# Patient Record
Sex: FEMALE | Race: BLACK OR AFRICAN AMERICAN | NOT HISPANIC OR LATINO
[De-identification: names, ages, dates, MRNs, and addresses within clinical notes are randomized per-mention and may not be internally consistent; named-entity substitution may affect disease eponyms.]

---

## 2017-04-13 PROBLEM — Z00.00 ENCOUNTER FOR PREVENTIVE HEALTH EXAMINATION: Status: ACTIVE | Noted: 2017-04-13

## 2017-04-17 ENCOUNTER — LABORATORY RESULT (OUTPATIENT)
Age: 37
End: 2017-04-17

## 2017-04-17 ENCOUNTER — APPOINTMENT (OUTPATIENT)
Dept: OBGYN | Facility: CLINIC | Age: 37
End: 2017-04-17

## 2017-04-17 VITALS
SYSTOLIC BLOOD PRESSURE: 120 MMHG | WEIGHT: 196 LBS | HEIGHT: 66 IN | BODY MASS INDEX: 31.5 KG/M2 | DIASTOLIC BLOOD PRESSURE: 80 MMHG

## 2017-04-17 DIAGNOSIS — Z80.3 FAMILY HISTORY OF MALIGNANT NEOPLASM OF BREAST: ICD-10-CM

## 2017-04-17 DIAGNOSIS — D25.9 LEIOMYOMA OF UTERUS, UNSPECIFIED: ICD-10-CM

## 2017-04-17 DIAGNOSIS — Z78.9 OTHER SPECIFIED HEALTH STATUS: ICD-10-CM

## 2017-04-17 LAB
BILIRUB UR QL STRIP: NORMAL
CLARITY UR: CLEAR
COLLECTION METHOD: NORMAL
GLUCOSE UR-MCNC: NORMAL
HCG UR QL: 1 EU/DL
HCG UR QL: POSITIVE
HGB UR QL STRIP.AUTO: NORMAL
KETONES UR-MCNC: NORMAL
LEUKOCYTE ESTERASE UR QL STRIP: NORMAL
NITRITE UR QL STRIP: NORMAL
PH UR STRIP: 6
PROT UR STRIP-MCNC: NORMAL
QUALITY CONTROL: YES
SP GR UR STRIP: 1.02

## 2017-04-25 LAB
ABO + RH PNL BLD: NORMAL
APPEARANCE: CLEAR
AR GENE MUT ANL BLD/T: NORMAL
B19V DNA FLD QL NAA+PROBE: NORMAL
BACTERIA UR CULT: ABNORMAL
BASOPHILS # BLD AUTO: 0.03 K/UL
BASOPHILS NFR BLD AUTO: 0.3 %
BILIRUBIN URINE: NEGATIVE
BLD GP AB SCN SERPL QL: NORMAL
BLOOD URINE: NEGATIVE
C TRACH RRNA SPEC QL NAA+PROBE: NORMAL
CMV IGG SERPL QL: >10 U/ML
CMV IGG SERPL-IMP: POSITIVE
CMV IGM SERPL QL: 10.6 AU/ML
CMV IGM SERPL QL: NEGATIVE
COLOR: ABNORMAL
CYTOLOGY CVX/VAG DOC THIN PREP: NORMAL
EOSINOPHIL # BLD AUTO: 0.01 K/UL
EOSINOPHIL NFR BLD AUTO: 0.1 %
FMR1 GENE MUT ANL BLD/T: NORMAL
GLUCOSE QUALITATIVE U: NORMAL MG/DL
HBV SURFACE AG SER QL: NONREACTIVE
HCT VFR BLD CALC: 37.7 %
HCV AB SER QL: NONREACTIVE
HCV S/CO RATIO: 0.12 S/CO
HGB A MFR BLD: 97.5 %
HGB A2 MFR BLD: 2.5 %
HGB BLD-MCNC: 12.4 G/DL
HGB FRACT BLD-IMP: NORMAL
HIV1+2 AB SPEC QL IA.RAPID: NONREACTIVE
HPV HIGH+LOW RISK DNA PNL CVX: NEGATIVE
IMM GRANULOCYTES NFR BLD AUTO: 0.2 %
KETONES URINE: NEGATIVE
LEUKOCYTE ESTERASE URINE: NEGATIVE
LYMPHOCYTES # BLD AUTO: 2.05 K/UL
LYMPHOCYTES NFR BLD AUTO: 23.2 %
MAN DIFF?: NORMAL
MCHC RBC-ENTMCNC: 27.7 PG
MCHC RBC-ENTMCNC: 32.9 GM/DL
MCV RBC AUTO: 84.2 FL
MEV IGG FLD QL IA: 261 AU/ML
MEV IGG+IGM SER-IMP: POSITIVE
MONOCYTES # BLD AUTO: 0.41 K/UL
MONOCYTES NFR BLD AUTO: 4.6 %
N GONORRHOEA RRNA SPEC QL NAA+PROBE: NORMAL
NEUTROPHILS # BLD AUTO: 6.32 K/UL
NEUTROPHILS NFR BLD AUTO: 71.6 %
NITRITE URINE: NEGATIVE
PH URINE: 6
PLATELET # BLD AUTO: 290 K/UL
PROTEIN URINE: NEGATIVE MG/DL
RBC # BLD: 4.48 M/UL
RBC # FLD: 12.3 %
RPR SER-TITR: NORMAL
RUBV IGG FLD-ACNC: 17.1 INDEX
RUBV IGG SER-IMP: POSITIVE
RUBV IGM FLD-ACNC: <20 AU/ML
SOURCE TP AMPLIFICATION: NORMAL
SPECIFIC GRAVITY URINE: 1.03
T GONDII AB SER-IMP: NEGATIVE
T GONDII AB SER-IMP: NEGATIVE
T GONDII IGG SER QL: <3 IU/ML
T GONDII IGM SER QL: <3 AU/ML
TSH SERPL-ACNC: 2.27 UIU/ML
UROBILINOGEN URINE: 1 MG/DL
VZV AB TITR SER: POSITIVE
VZV IGG SER IF-ACNC: >4000 INDEX
WBC # FLD AUTO: 8.84 K/UL

## 2017-04-26 LAB — CFTR MUT TESTED BLD/T: NORMAL

## 2017-04-27 ENCOUNTER — MESSAGE (OUTPATIENT)
Age: 37
End: 2017-04-27

## 2017-05-08 ENCOUNTER — APPOINTMENT (OUTPATIENT)
Dept: OBGYN | Facility: CLINIC | Age: 37
End: 2017-05-08

## 2017-05-17 ENCOUNTER — APPOINTMENT (OUTPATIENT)
Dept: OBGYN | Facility: CLINIC | Age: 37
End: 2017-05-17

## 2017-05-17 VITALS
SYSTOLIC BLOOD PRESSURE: 124 MMHG | HEIGHT: 66 IN | WEIGHT: 194 LBS | DIASTOLIC BLOOD PRESSURE: 80 MMHG | BODY MASS INDEX: 31.18 KG/M2

## 2017-05-24 ENCOUNTER — MESSAGE (OUTPATIENT)
Age: 37
End: 2017-05-24

## 2017-05-25 ENCOUNTER — RESULT REVIEW (OUTPATIENT)
Age: 37
End: 2017-05-25

## 2017-05-25 LAB — BACTERIA UR CULT: ABNORMAL

## 2017-05-31 ENCOUNTER — ASOB RESULT (OUTPATIENT)
Age: 37
End: 2017-05-31

## 2017-05-31 ENCOUNTER — APPOINTMENT (OUTPATIENT)
Dept: ANTEPARTUM | Facility: CLINIC | Age: 37
End: 2017-05-31

## 2017-06-05 ENCOUNTER — RX RENEWAL (OUTPATIENT)
Age: 37
End: 2017-06-05

## 2017-06-06 ENCOUNTER — APPOINTMENT (OUTPATIENT)
Dept: MATERNAL FETAL MEDICINE | Facility: CLINIC | Age: 37
End: 2017-06-06

## 2017-06-12 ENCOUNTER — APPOINTMENT (OUTPATIENT)
Dept: OBGYN | Facility: CLINIC | Age: 37
End: 2017-06-12

## 2017-06-12 ENCOUNTER — OTHER (OUTPATIENT)
Age: 37
End: 2017-06-12

## 2017-06-12 ENCOUNTER — RESULT CHARGE (OUTPATIENT)
Age: 37
End: 2017-06-12

## 2017-06-12 VITALS — SYSTOLIC BLOOD PRESSURE: 120 MMHG | BODY MASS INDEX: 30.7 KG/M2 | WEIGHT: 190.2 LBS | DIASTOLIC BLOOD PRESSURE: 70 MMHG

## 2017-06-12 LAB
BILIRUB UR QL STRIP: NORMAL
GLUCOSE UR-MCNC: NORMAL
HCG UR QL: 0.2 EU/DL
HGB UR QL STRIP.AUTO: NORMAL
KETONES UR-MCNC: NORMAL
LEUKOCYTE ESTERASE UR QL STRIP: NORMAL
NITRITE UR QL STRIP: NORMAL
PH UR STRIP: 7.5
PROT UR STRIP-MCNC: NORMAL
SP GR UR STRIP: 1.02

## 2017-06-12 RX ORDER — DOXYLAMINE SUCCINATE AND PYRIDOXINE HYDROCHLORIDE 10; 10 MG/1; MG/1
10-10 TABLET, DELAYED RELEASE ORAL
Qty: 60 | Refills: 6 | Status: DISCONTINUED | COMMUNITY
Start: 2017-05-17 | End: 2017-06-12

## 2017-06-28 ENCOUNTER — APPOINTMENT (OUTPATIENT)
Dept: OBGYN | Facility: CLINIC | Age: 37
End: 2017-06-28

## 2017-07-18 ENCOUNTER — RX RENEWAL (OUTPATIENT)
Age: 37
End: 2017-07-18

## 2017-07-19 ENCOUNTER — APPOINTMENT (OUTPATIENT)
Dept: OBGYN | Facility: CLINIC | Age: 37
End: 2017-07-19

## 2017-07-19 VITALS — BODY MASS INDEX: 31.55 KG/M2 | SYSTOLIC BLOOD PRESSURE: 120 MMHG | WEIGHT: 195.5 LBS | DIASTOLIC BLOOD PRESSURE: 76 MMHG

## 2017-07-20 LAB
BILIRUB UR QL STRIP: NORMAL
CLARITY UR: CLEAR
COLLECTION METHOD: NORMAL
GLUCOSE UR-MCNC: NORMAL
HCG UR QL: 0.2 EU/DL
HGB UR QL STRIP.AUTO: NORMAL
KETONES UR-MCNC: NORMAL
LEUKOCYTE ESTERASE UR QL STRIP: NORMAL
NITRITE UR QL STRIP: NORMAL
PH UR STRIP: 6
PROT UR STRIP-MCNC: NORMAL
SP GR UR STRIP: 1.01

## 2017-07-24 ENCOUNTER — RESULT REVIEW (OUTPATIENT)
Age: 37
End: 2017-07-24

## 2017-07-24 LAB
1ST TRIMESTER DATA: NORMAL
2ND TRIMESTER DATA: NORMAL
AFP PNL SERPL: NORMAL
AFP SERPL-ACNC: NORMAL
AFP SERPL-ACNC: NORMAL
B-HCG FREE SERPL-MCNC: NORMAL
CLINICAL BIOCHEMIST REVIEW: NORMAL
FREE BETA HCG 1ST TRIMESTER: NORMAL
INHIBIN A SERPL-MCNC: NORMAL
NOTES NTD: NORMAL
NT: NORMAL
PAPP-A SERPL-ACNC: NORMAL
U ESTRIOL SERPL-SCNC: NORMAL

## 2017-07-27 ENCOUNTER — APPOINTMENT (OUTPATIENT)
Dept: ANTEPARTUM | Facility: CLINIC | Age: 37
End: 2017-07-27

## 2017-08-09 ENCOUNTER — ASOB RESULT (OUTPATIENT)
Age: 37
End: 2017-08-09

## 2017-08-09 ENCOUNTER — APPOINTMENT (OUTPATIENT)
Dept: ANTEPARTUM | Facility: CLINIC | Age: 37
End: 2017-08-09
Payer: OTHER GOVERNMENT

## 2017-08-09 PROCEDURE — 76817 TRANSVAGINAL US OBSTETRIC: CPT

## 2017-08-09 PROCEDURE — 76811 OB US DETAILED SNGL FETUS: CPT

## 2017-08-30 ENCOUNTER — APPOINTMENT (OUTPATIENT)
Dept: OBGYN | Facility: CLINIC | Age: 37
End: 2017-08-30
Payer: OTHER GOVERNMENT

## 2017-08-30 VITALS
DIASTOLIC BLOOD PRESSURE: 72 MMHG | BODY MASS INDEX: 32.3 KG/M2 | HEIGHT: 66 IN | TEMPERATURE: 98.6 F | RESPIRATION RATE: 16 BRPM | SYSTOLIC BLOOD PRESSURE: 128 MMHG | WEIGHT: 201 LBS

## 2017-08-30 LAB
BILIRUB UR QL STRIP: NORMAL
CLARITY UR: CLEAR
COLLECTION METHOD: NORMAL
GLUCOSE UR-MCNC: 100
HCG UR QL: 0.2 EU/DL
HGB UR QL STRIP.AUTO: NORMAL
KETONES UR-MCNC: NORMAL
LEUKOCYTE ESTERASE UR QL STRIP: NORMAL
NITRITE UR QL STRIP: NORMAL
PH UR STRIP: 6
PROT UR STRIP-MCNC: NORMAL
SP GR UR STRIP: 1.03

## 2017-08-30 PROCEDURE — 0502F SUBSEQUENT PRENATAL CARE: CPT

## 2017-08-31 RX ORDER — ASPIRIN 81 MG/1
81 TABLET, COATED ORAL DAILY
Qty: 30 | Refills: 3 | Status: COMPLETED | COMMUNITY
Start: 2017-06-12 | End: 2017-06-12

## 2017-08-31 RX ORDER — ASCORBIC ACID, CHOLECALCIFEROL, .ALPHA.-TOCOPHEROL, DL-, FOLIC ACID, PYRIDOXINE HYDROCHLORIDE, CYANOCOBALAMIN, CALCIUM FORMATE, FERROUS ASPARTO GLYCINATE, MAGNESIUM OXIDE AND DOCONEXENT 90; 400; 40; 1; 26; 25; 155; 18; 50; 300 MG/1; [IU]/1; [IU]/1; MG/1; MG/1; UG/1; MG/1; MG/1; MG/1; MG/1
18-0.6-0.4-3 CAPSULE, GELATIN COATED ORAL
Qty: 28 | Refills: 6 | Status: COMPLETED | COMMUNITY
Start: 2017-05-17 | End: 2017-05-17

## 2017-08-31 RX ORDER — METOCLOPRAMIDE 10 MG/1
10 TABLET ORAL 4 TIMES DAILY
Qty: 120 | Refills: 0 | Status: COMPLETED | COMMUNITY
Start: 2017-06-12 | End: 2017-06-12

## 2017-09-18 DIAGNOSIS — Z3A.10 10 WEEKS GESTATION OF PREGNANCY: ICD-10-CM

## 2017-09-18 DIAGNOSIS — Z3A.19 19 WEEKS GESTATION OF PREGNANCY: ICD-10-CM

## 2017-09-18 DIAGNOSIS — Z3A.14 14 WEEKS GESTATION OF PREGNANCY: ICD-10-CM

## 2017-09-19 ENCOUNTER — APPOINTMENT (OUTPATIENT)
Dept: ANTEPARTUM | Facility: CLINIC | Age: 37
End: 2017-09-19
Payer: OTHER GOVERNMENT

## 2017-09-19 ENCOUNTER — ASOB RESULT (OUTPATIENT)
Age: 37
End: 2017-09-19

## 2017-09-19 PROCEDURE — 76805 OB US >/= 14 WKS SNGL FETUS: CPT

## 2017-09-27 ENCOUNTER — OUTPATIENT (OUTPATIENT)
Dept: INPATIENT UNIT | Facility: HOSPITAL | Age: 37
LOS: 1 days | Discharge: HOME | End: 2017-09-27

## 2017-09-27 ENCOUNTER — APPOINTMENT (OUTPATIENT)
Dept: OBGYN | Facility: CLINIC | Age: 37
End: 2017-09-27
Payer: OTHER GOVERNMENT

## 2017-09-27 VITALS
HEART RATE: 92 BPM | SYSTOLIC BLOOD PRESSURE: 142 MMHG | BODY MASS INDEX: 33.11 KG/M2 | DIASTOLIC BLOOD PRESSURE: 82 MMHG | HEIGHT: 66 IN | TEMPERATURE: 98.7 F | WEIGHT: 206 LBS

## 2017-09-27 DIAGNOSIS — I10 ESSENTIAL (PRIMARY) HYPERTENSION: ICD-10-CM

## 2017-09-27 LAB
ALBUMIN SERPL ELPH-MCNC: 3 G/DL — LOW (ref 3.3–5)
ALP SERPL-CCNC: 106 U/L — SIGNIFICANT CHANGE UP (ref 40–120)
ALT FLD-CCNC: 15 U/L — SIGNIFICANT CHANGE UP (ref 12–78)
ANION GAP SERPL CALC-SCNC: 9 MMOL/L — SIGNIFICANT CHANGE UP (ref 5–17)
APPEARANCE UR: CLEAR — SIGNIFICANT CHANGE UP
APTT BLD: 26.4 SEC — LOW (ref 27.5–37.4)
AST SERPL-CCNC: 15 U/L — SIGNIFICANT CHANGE UP (ref 15–37)
BASOPHILS # BLD AUTO: 0.1 K/UL — SIGNIFICANT CHANGE UP (ref 0–0.2)
BASOPHILS NFR BLD AUTO: 0.5 % — SIGNIFICANT CHANGE UP (ref 0–2)
BILIRUB SERPL-MCNC: 0.2 MG/DL — SIGNIFICANT CHANGE UP (ref 0.2–1.2)
BILIRUB UR QL STRIP: NORMAL
BILIRUB UR-MCNC: NEGATIVE — SIGNIFICANT CHANGE UP
BUN SERPL-MCNC: 5 MG/DL — LOW (ref 7–23)
CALCIUM SERPL-MCNC: 8.8 MG/DL — SIGNIFICANT CHANGE UP (ref 8.5–10.1)
CHLORIDE SERPL-SCNC: 107 MMOL/L — SIGNIFICANT CHANGE UP (ref 96–108)
CLARITY UR: CLEAR
CO2 SERPL-SCNC: 23 MMOL/L — SIGNIFICANT CHANGE UP (ref 22–31)
COLLECTION METHOD: NORMAL
COLOR SPEC: YELLOW — SIGNIFICANT CHANGE UP
CREAT ?TM UR-MCNC: 38 MG/DL — SIGNIFICANT CHANGE UP
CREAT SERPL-MCNC: 0.68 MG/DL — SIGNIFICANT CHANGE UP (ref 0.5–1.3)
DIFF PNL FLD: NEGATIVE — SIGNIFICANT CHANGE UP
EOSINOPHIL # BLD AUTO: 0.1 K/UL — SIGNIFICANT CHANGE UP (ref 0–0.5)
EOSINOPHIL NFR BLD AUTO: 0.6 % — SIGNIFICANT CHANGE UP (ref 0–6)
FIBRINOGEN PPP-MCNC: 644 MG/DL — HIGH (ref 310–510)
GLUCOSE SERPL-MCNC: 90 MG/DL — SIGNIFICANT CHANGE UP (ref 70–99)
GLUCOSE UR QL: NEGATIVE MG/DL — SIGNIFICANT CHANGE UP
GLUCOSE UR-MCNC: NORMAL
HCG UR QL: NORMAL EU/DL
HCT VFR BLD CALC: 35.1 % — SIGNIFICANT CHANGE UP (ref 34.5–45)
HGB BLD-MCNC: 12.2 G/DL — SIGNIFICANT CHANGE UP (ref 11.5–15.5)
HGB UR QL STRIP.AUTO: NORMAL
INR BLD: 0.95 RATIO — SIGNIFICANT CHANGE UP (ref 0.88–1.16)
KETONES UR-MCNC: NEGATIVE — SIGNIFICANT CHANGE UP
KETONES UR-MCNC: NORMAL
LDH SERPL L TO P-CCNC: 204 U/L — SIGNIFICANT CHANGE UP (ref 50–242)
LEUKOCYTE ESTERASE UR QL STRIP: NORMAL
LEUKOCYTE ESTERASE UR-ACNC: NEGATIVE — SIGNIFICANT CHANGE UP
LYMPHOCYTES # BLD AUTO: 1.9 K/UL — SIGNIFICANT CHANGE UP (ref 1–3.3)
LYMPHOCYTES # BLD AUTO: 11.5 % — LOW (ref 13–44)
MCHC RBC-ENTMCNC: 29.8 PG — SIGNIFICANT CHANGE UP (ref 27–34)
MCHC RBC-ENTMCNC: 34.7 GM/DL — SIGNIFICANT CHANGE UP (ref 32–36)
MCV RBC AUTO: 85.9 FL — SIGNIFICANT CHANGE UP (ref 80–100)
MONOCYTES # BLD AUTO: 0.9 K/UL — SIGNIFICANT CHANGE UP (ref 0–0.9)
MONOCYTES NFR BLD AUTO: 5.4 % — SIGNIFICANT CHANGE UP (ref 2–14)
NEUTROPHILS # BLD AUTO: 13.4 K/UL — HIGH (ref 1.8–7.4)
NEUTROPHILS NFR BLD AUTO: 82 % — HIGH (ref 43–77)
NITRITE UR QL STRIP: NORMAL
NITRITE UR-MCNC: NEGATIVE — SIGNIFICANT CHANGE UP
PH UR STRIP: 7
PH UR: 6.5 — SIGNIFICANT CHANGE UP (ref 5–8)
PLATELET # BLD AUTO: 200 K/UL — SIGNIFICANT CHANGE UP (ref 150–400)
POTASSIUM SERPL-MCNC: 3.5 MMOL/L — SIGNIFICANT CHANGE UP (ref 3.5–5.3)
POTASSIUM SERPL-SCNC: 3.5 MMOL/L — SIGNIFICANT CHANGE UP (ref 3.5–5.3)
PROT ?TM UR-MCNC: 7 MG/DL — SIGNIFICANT CHANGE UP (ref 0–12)
PROT SERPL-MCNC: 6.9 GM/DL — SIGNIFICANT CHANGE UP (ref 6–8.3)
PROT UR STRIP-MCNC: 7
PROT UR-MCNC: NEGATIVE MG/DL — SIGNIFICANT CHANGE UP
PROT/CREAT UR-RTO: 0.2 RATIO — SIGNIFICANT CHANGE UP (ref 0–0.2)
PROTHROM AB SERPL-ACNC: 10.3 SEC — SIGNIFICANT CHANGE UP (ref 9.8–12.7)
RBC # BLD: 4.08 M/UL — SIGNIFICANT CHANGE UP (ref 3.8–5.2)
RBC # FLD: 11.7 % — SIGNIFICANT CHANGE UP (ref 10.3–14.5)
SODIUM SERPL-SCNC: 139 MMOL/L — SIGNIFICANT CHANGE UP (ref 135–145)
SP GR SPEC: 1.01 — SIGNIFICANT CHANGE UP (ref 1.01–1.02)
SP GR UR STRIP: 1
URATE SERPL-MCNC: 4 MG/DL — SIGNIFICANT CHANGE UP (ref 2.5–7)
UROBILINOGEN FLD QL: NEGATIVE MG/DL — SIGNIFICANT CHANGE UP
WBC # BLD: 16.4 K/UL — HIGH (ref 3.8–10.5)
WBC # FLD AUTO: 16.4 K/UL — HIGH (ref 3.8–10.5)

## 2017-09-27 PROCEDURE — 90656 IIV3 VACC NO PRSV 0.5 ML IM: CPT

## 2017-09-27 PROCEDURE — 90471 IMMUNIZATION ADMIN: CPT

## 2017-09-27 PROCEDURE — 82951 GLUCOSE TOLERANCE TEST (GTT): CPT | Mod: QW

## 2017-09-27 PROCEDURE — 36415 COLL VENOUS BLD VENIPUNCTURE: CPT

## 2017-09-27 PROCEDURE — 0502F SUBSEQUENT PRENATAL CARE: CPT

## 2017-09-28 LAB
BASOPHILS # BLD AUTO: 0.02 K/UL
BASOPHILS NFR BLD AUTO: 0.2 %
EOSINOPHIL # BLD AUTO: 0.07 K/UL
EOSINOPHIL NFR BLD AUTO: 0.6 %
GLUCOSE 1H P 50 G GLC PO SERPL-MCNC: 117 MG/DL
HCT VFR BLD CALC: 35.3 %
HGB BLD-MCNC: 11.4 G/DL
IMM GRANULOCYTES NFR BLD AUTO: 1.2 %
LYMPHOCYTES # BLD AUTO: 1.82 K/UL
LYMPHOCYTES NFR BLD AUTO: 14.9 %
MAN DIFF?: NORMAL
MCHC RBC-ENTMCNC: 28 PG
MCHC RBC-ENTMCNC: 32.3 GM/DL
MCV RBC AUTO: 86.7 FL
MONOCYTES # BLD AUTO: 0.55 K/UL
MONOCYTES NFR BLD AUTO: 4.5 %
NEUTROPHILS # BLD AUTO: 9.57 K/UL
NEUTROPHILS NFR BLD AUTO: 78.6 %
PLATELET # BLD AUTO: 239 K/UL
RBC # BLD: 4.07 M/UL
RBC # FLD: 13.1 %
WBC # FLD AUTO: 12.18 K/UL

## 2017-10-03 DIAGNOSIS — O47.9 FALSE LABOR, UNSPECIFIED: ICD-10-CM

## 2017-10-04 ENCOUNTER — APPOINTMENT (OUTPATIENT)
Dept: OBGYN | Facility: CLINIC | Age: 37
End: 2017-10-04
Payer: OTHER GOVERNMENT

## 2017-10-04 VITALS
BODY MASS INDEX: 33.11 KG/M2 | DIASTOLIC BLOOD PRESSURE: 72 MMHG | WEIGHT: 206 LBS | HEIGHT: 66 IN | SYSTOLIC BLOOD PRESSURE: 120 MMHG

## 2017-10-04 LAB
BILIRUB UR QL STRIP: NORMAL
CLARITY UR: CLEAR
COLLECTION METHOD: NORMAL
GLUCOSE UR-MCNC: NORMAL
HCG UR QL: 0.2 EU/DL
HGB UR QL STRIP.AUTO: NORMAL
KETONES UR-MCNC: NORMAL
LEUKOCYTE ESTERASE UR QL STRIP: NORMAL
NITRITE UR QL STRIP: NORMAL
PH UR STRIP: 7.5
PROT UR STRIP-MCNC: NORMAL
SP GR UR STRIP: 1.02

## 2017-10-04 PROCEDURE — 0502F SUBSEQUENT PRENATAL CARE: CPT

## 2017-10-05 ENCOUNTER — APPOINTMENT (OUTPATIENT)
Dept: PEDIATRIC CARDIOLOGY | Facility: CLINIC | Age: 37
End: 2017-10-05
Payer: OTHER GOVERNMENT

## 2017-10-05 PROCEDURE — 93325 DOPPLER ECHO COLOR FLOW MAPG: CPT | Mod: 59

## 2017-10-05 PROCEDURE — 76820 UMBILICAL ARTERY ECHO: CPT

## 2017-10-05 PROCEDURE — 76825 ECHO EXAM OF FETAL HEART: CPT

## 2017-10-05 PROCEDURE — 76827 ECHO EXAM OF FETAL HEART: CPT

## 2017-10-05 PROCEDURE — 76821 MIDDLE CEREBRAL ARTERY ECHO: CPT

## 2017-10-05 PROCEDURE — 99242 OFF/OP CONSLTJ NEW/EST SF 20: CPT | Mod: 25

## 2017-10-18 ENCOUNTER — APPOINTMENT (OUTPATIENT)
Dept: OBGYN | Facility: CLINIC | Age: 37
End: 2017-10-18
Payer: OTHER GOVERNMENT

## 2017-10-18 PROCEDURE — 90471 IMMUNIZATION ADMIN: CPT

## 2017-10-18 PROCEDURE — 90715 TDAP VACCINE 7 YRS/> IM: CPT

## 2017-10-18 PROCEDURE — 0502F SUBSEQUENT PRENATAL CARE: CPT

## 2017-10-26 ENCOUNTER — APPOINTMENT (OUTPATIENT)
Dept: ANTEPARTUM | Facility: CLINIC | Age: 37
End: 2017-10-26
Payer: OTHER GOVERNMENT

## 2017-10-31 ENCOUNTER — APPOINTMENT (OUTPATIENT)
Dept: ANTEPARTUM | Facility: CLINIC | Age: 37
End: 2017-10-31
Payer: OTHER GOVERNMENT

## 2017-10-31 ENCOUNTER — ASOB RESULT (OUTPATIENT)
Age: 37
End: 2017-10-31

## 2017-10-31 DIAGNOSIS — Z34.90 ENCOUNTER FOR SUPERVISION OF NORMAL PREGNANCY, UNSPECIFIED, UNSPECIFIED TRIMESTER: ICD-10-CM

## 2017-10-31 DIAGNOSIS — Z3A.30 30 WEEKS GESTATION OF PREGNANCY: ICD-10-CM

## 2017-10-31 DIAGNOSIS — Z3A.25 25 WEEKS GESTATION OF PREGNANCY: ICD-10-CM

## 2017-10-31 DIAGNOSIS — O21.9 VOMITING OF PREGNANCY, UNSPECIFIED: ICD-10-CM

## 2017-10-31 DIAGNOSIS — Z34.02 ENCOUNTER FOR SUPERVISION OF NORMAL FIRST PREGNANCY, SECOND TRIMESTER: ICD-10-CM

## 2017-10-31 DIAGNOSIS — Z3A.29 29 WEEKS GESTATION OF PREGNANCY: ICD-10-CM

## 2017-10-31 PROCEDURE — 76819 FETAL BIOPHYS PROFIL W/O NST: CPT

## 2017-10-31 PROCEDURE — 76816 OB US FOLLOW-UP PER FETUS: CPT

## 2017-11-01 ENCOUNTER — APPOINTMENT (OUTPATIENT)
Dept: OBGYN | Facility: CLINIC | Age: 37
End: 2017-11-01

## 2017-11-08 ENCOUNTER — APPOINTMENT (OUTPATIENT)
Dept: OBGYN | Facility: CLINIC | Age: 37
End: 2017-11-08
Payer: OTHER GOVERNMENT

## 2017-11-08 ENCOUNTER — LABORATORY RESULT (OUTPATIENT)
Age: 37
End: 2017-11-08

## 2017-11-08 DIAGNOSIS — B00.9 HERPESVIRAL INFECTION, UNSPECIFIED: ICD-10-CM

## 2017-11-08 PROCEDURE — 36415 COLL VENOUS BLD VENIPUNCTURE: CPT

## 2017-11-08 PROCEDURE — 0502F SUBSEQUENT PRENATAL CARE: CPT

## 2017-11-09 LAB
BASOPHILS # BLD AUTO: 0.02 K/UL
BASOPHILS NFR BLD AUTO: 0.2 %
EOSINOPHIL # BLD AUTO: 0.06 K/UL
EOSINOPHIL NFR BLD AUTO: 0.6 %
HCT VFR BLD CALC: 37.6 %
HGB BLD-MCNC: 12.1 G/DL
HIV1+2 AB SPEC QL IA.RAPID: NONREACTIVE
IMM GRANULOCYTES NFR BLD AUTO: 1.2 %
LYMPHOCYTES # BLD AUTO: 1.8 K/UL
LYMPHOCYTES NFR BLD AUTO: 17.1 %
MAN DIFF?: NORMAL
MCHC RBC-ENTMCNC: 28.3 PG
MCHC RBC-ENTMCNC: 32.2 GM/DL
MCV RBC AUTO: 87.9 FL
MONOCYTES # BLD AUTO: 0.56 K/UL
MONOCYTES NFR BLD AUTO: 5.3 %
NEUTROPHILS # BLD AUTO: 7.93 K/UL
NEUTROPHILS NFR BLD AUTO: 75.6 %
PLATELET # BLD AUTO: 212 K/UL
RBC # BLD: 4.28 M/UL
RBC # FLD: 13 %
WBC # FLD AUTO: 10.5 K/UL

## 2017-11-13 LAB
GP B STREP DNA SPEC QL NAA+PROBE: DETECTED
GP B STREP DNA SPEC QL NAA+PROBE: DETECTED
GP B STREP DNA SPEC QL NAA+PROBE: NORMAL
GP B STREP DNA SPEC QL NAA+PROBE: NORMAL
SOURCE GBS: NORMAL
SOURCE GBS: NORMAL

## 2017-11-20 ENCOUNTER — APPOINTMENT (OUTPATIENT)
Dept: OBGYN | Facility: CLINIC | Age: 37
End: 2017-11-20
Payer: OTHER GOVERNMENT

## 2017-11-20 ENCOUNTER — RESULT REVIEW (OUTPATIENT)
Age: 37
End: 2017-11-20

## 2017-11-20 VITALS
HEIGHT: 66 IN | DIASTOLIC BLOOD PRESSURE: 64 MMHG | SYSTOLIC BLOOD PRESSURE: 138 MMHG | WEIGHT: 217 LBS | RESPIRATION RATE: 16 BRPM | TEMPERATURE: 98.6 F | BODY MASS INDEX: 34.87 KG/M2

## 2017-11-20 DIAGNOSIS — O09.513 SUPERVISION OF ELDERLY PRIMIGRAVIDA, THIRD TRIMESTER: ICD-10-CM

## 2017-11-20 DIAGNOSIS — Z22.330 CARRIER OF GROUP B STREPTOCOCCUS: ICD-10-CM

## 2017-11-20 DIAGNOSIS — Z3A.32 32 WEEKS GESTATION OF PREGNANCY: ICD-10-CM

## 2017-11-20 LAB
BILIRUB UR QL STRIP: NORMAL
CLARITY UR: CLEAR
COLLECTION METHOD: NORMAL
GLUCOSE UR-MCNC: NORMAL
HCG UR QL: 0.2 EU/DL
HGB UR QL STRIP.AUTO: NORMAL
KETONES UR-MCNC: NORMAL
LEUKOCYTE ESTERASE UR QL STRIP: NORMAL
NITRITE UR QL STRIP: NORMAL
PH UR STRIP: 6
PROT UR STRIP-MCNC: NORMAL
SP GR UR STRIP: 1.03

## 2017-11-20 PROCEDURE — 0502F SUBSEQUENT PRENATAL CARE: CPT

## 2017-11-21 ENCOUNTER — APPOINTMENT (OUTPATIENT)
Dept: ANTEPARTUM | Facility: CLINIC | Age: 37
End: 2017-11-21
Payer: OTHER GOVERNMENT

## 2017-11-21 ENCOUNTER — ASOB RESULT (OUTPATIENT)
Age: 37
End: 2017-11-21

## 2017-11-21 PROCEDURE — 76816 OB US FOLLOW-UP PER FETUS: CPT

## 2017-11-21 PROCEDURE — 76819 FETAL BIOPHYS PROFIL W/O NST: CPT

## 2017-11-27 ENCOUNTER — APPOINTMENT (OUTPATIENT)
Dept: OBGYN | Facility: CLINIC | Age: 37
End: 2017-11-27
Payer: OTHER GOVERNMENT

## 2017-11-27 VITALS
RESPIRATION RATE: 16 BRPM | TEMPERATURE: 98 F | WEIGHT: 221 LBS | BODY MASS INDEX: 35.52 KG/M2 | SYSTOLIC BLOOD PRESSURE: 112 MMHG | DIASTOLIC BLOOD PRESSURE: 60 MMHG | HEIGHT: 66 IN

## 2017-11-27 PROCEDURE — 76818 FETAL BIOPHYS PROFILE W/NST: CPT

## 2017-11-27 PROCEDURE — 0502F SUBSEQUENT PRENATAL CARE: CPT

## 2017-11-28 ENCOUNTER — APPOINTMENT (OUTPATIENT)
Dept: ANTEPARTUM | Facility: CLINIC | Age: 37
End: 2017-11-28

## 2017-11-29 LAB
BILIRUB UR QL STRIP: NORMAL
CLARITY UR: CLEAR
COLLECTION METHOD: NORMAL
GLUCOSE UR-MCNC: NORMAL
HCG UR QL: 0.2 EU/DL
HGB UR QL STRIP.AUTO: NORMAL
KETONES UR-MCNC: NORMAL
LEUKOCYTE ESTERASE UR QL STRIP: NORMAL
NITRITE UR QL STRIP: NORMAL
PH UR STRIP: 6.5
PROT UR STRIP-MCNC: NORMAL
SP GR UR STRIP: 1.02

## 2017-11-30 ENCOUNTER — APPOINTMENT (OUTPATIENT)
Dept: ANTEPARTUM | Facility: CLINIC | Age: 37
End: 2017-11-30
Payer: OTHER GOVERNMENT

## 2017-11-30 ENCOUNTER — ASOB RESULT (OUTPATIENT)
Age: 37
End: 2017-11-30

## 2017-11-30 PROCEDURE — 76819 FETAL BIOPHYS PROFIL W/O NST: CPT

## 2017-11-30 PROCEDURE — 76815 OB US LIMITED FETUS(S): CPT

## 2017-12-03 ENCOUNTER — OUTPATIENT (OUTPATIENT)
Dept: OUTPATIENT SERVICES | Facility: HOSPITAL | Age: 37
LOS: 1 days | Discharge: HOME | End: 2017-12-03

## 2017-12-03 DIAGNOSIS — O09.513 SUPERVISION OF ELDERLY PRIMIGRAVIDA, THIRD TRIMESTER: ICD-10-CM

## 2017-12-03 DIAGNOSIS — Z01.818 ENCOUNTER FOR OTHER PREPROCEDURAL EXAMINATION: ICD-10-CM

## 2017-12-03 DIAGNOSIS — O34.29 MATERNAL CARE DUE TO UTERINE SCAR FROM OTHER PREVIOUS SURGERY: ICD-10-CM

## 2017-12-03 DIAGNOSIS — A60.00 HERPESVIRAL INFECTION OF UROGENITAL SYSTEM, UNSPECIFIED: ICD-10-CM

## 2017-12-03 DIAGNOSIS — D25.9 LEIOMYOMA OF UTERUS, UNSPECIFIED: ICD-10-CM

## 2017-12-03 DIAGNOSIS — O34.13 MATERNAL CARE FOR BENIGN TUMOR OF CORPUS UTERI, THIRD TRIMESTER: ICD-10-CM

## 2017-12-03 LAB
ANION GAP SERPL CALC-SCNC: 11 MMOL/L — SIGNIFICANT CHANGE UP (ref 5–17)
BASOPHILS # BLD AUTO: 0.1 K/UL — SIGNIFICANT CHANGE UP (ref 0–0.2)
BASOPHILS NFR BLD AUTO: 0.6 % — SIGNIFICANT CHANGE UP (ref 0–2)
BLD GP AB SCN SERPL QL: SIGNIFICANT CHANGE UP
BUN SERPL-MCNC: 5 MG/DL — LOW (ref 7–23)
CALCIUM SERPL-MCNC: 8.5 MG/DL — SIGNIFICANT CHANGE UP (ref 8.5–10.1)
CHLORIDE SERPL-SCNC: 107 MMOL/L — SIGNIFICANT CHANGE UP (ref 96–108)
CO2 SERPL-SCNC: 22 MMOL/L — SIGNIFICANT CHANGE UP (ref 22–31)
CREAT SERPL-MCNC: 0.76 MG/DL — SIGNIFICANT CHANGE UP (ref 0.5–1.3)
EOSINOPHIL # BLD AUTO: 0 K/UL — SIGNIFICANT CHANGE UP (ref 0–0.5)
EOSINOPHIL NFR BLD AUTO: 0.4 % — SIGNIFICANT CHANGE UP (ref 0–6)
GLUCOSE SERPL-MCNC: 100 MG/DL — HIGH (ref 70–99)
HCT VFR BLD CALC: 37.6 % — SIGNIFICANT CHANGE UP (ref 34.5–45)
HGB BLD-MCNC: 12.5 G/DL — SIGNIFICANT CHANGE UP (ref 11.5–15.5)
LYMPHOCYTES # BLD AUTO: 1.5 K/UL — SIGNIFICANT CHANGE UP (ref 1–3.3)
LYMPHOCYTES # BLD AUTO: 14.4 % — SIGNIFICANT CHANGE UP (ref 13–44)
MCHC RBC-ENTMCNC: 28.8 PG — SIGNIFICANT CHANGE UP (ref 27–34)
MCHC RBC-ENTMCNC: 33.2 GM/DL — SIGNIFICANT CHANGE UP (ref 32–36)
MCV RBC AUTO: 86.5 FL — SIGNIFICANT CHANGE UP (ref 80–100)
MONOCYTES # BLD AUTO: 0.5 K/UL — SIGNIFICANT CHANGE UP (ref 0–0.9)
MONOCYTES NFR BLD AUTO: 4.8 % — SIGNIFICANT CHANGE UP (ref 2–14)
NEUTROPHILS # BLD AUTO: 8.3 K/UL — HIGH (ref 1.8–7.4)
NEUTROPHILS NFR BLD AUTO: 79.9 % — HIGH (ref 43–77)
PLATELET # BLD AUTO: 222 K/UL — SIGNIFICANT CHANGE UP (ref 150–400)
POTASSIUM SERPL-MCNC: 3.5 MMOL/L — SIGNIFICANT CHANGE UP (ref 3.5–5.3)
POTASSIUM SERPL-SCNC: 3.5 MMOL/L — SIGNIFICANT CHANGE UP (ref 3.5–5.3)
RBC # BLD: 4.34 M/UL — SIGNIFICANT CHANGE UP (ref 3.8–5.2)
RBC # FLD: 11.9 % — SIGNIFICANT CHANGE UP (ref 10.3–14.5)
SODIUM SERPL-SCNC: 140 MMOL/L — SIGNIFICANT CHANGE UP (ref 135–145)
TYPE + AB SCN PNL BLD: SIGNIFICANT CHANGE UP
WBC # BLD: 10.4 K/UL — SIGNIFICANT CHANGE UP (ref 3.8–10.5)
WBC # FLD AUTO: 10.4 K/UL — SIGNIFICANT CHANGE UP (ref 3.8–10.5)

## 2017-12-04 ENCOUNTER — INPATIENT (INPATIENT)
Facility: HOSPITAL | Age: 37
LOS: 2 days | Discharge: HOME | End: 2017-12-07
Attending: SPECIALIST | Admitting: SPECIALIST
Payer: OTHER GOVERNMENT

## 2017-12-04 ENCOUNTER — RESULT REVIEW (OUTPATIENT)
Age: 37
End: 2017-12-04

## 2017-12-04 VITALS — WEIGHT: 218.26 LBS | HEIGHT: 66 IN

## 2017-12-04 LAB
ALBUMIN SERPL ELPH-MCNC: 2.5 G/DL — LOW (ref 3.3–5)
ALP SERPL-CCNC: 156 U/L — HIGH (ref 40–120)
ALT FLD-CCNC: 13 U/L — SIGNIFICANT CHANGE UP (ref 12–78)
ANION GAP SERPL CALC-SCNC: 10 MMOL/L — SIGNIFICANT CHANGE UP (ref 5–17)
AST SERPL-CCNC: 21 U/L — SIGNIFICANT CHANGE UP (ref 15–37)
BILIRUB SERPL-MCNC: 0.3 MG/DL — SIGNIFICANT CHANGE UP (ref 0.2–1.2)
BUN SERPL-MCNC: 5 MG/DL — LOW (ref 7–23)
CALCIUM SERPL-MCNC: 8.8 MG/DL — SIGNIFICANT CHANGE UP (ref 8.5–10.1)
CHLORIDE SERPL-SCNC: 104 MMOL/L — SIGNIFICANT CHANGE UP (ref 96–108)
CO2 SERPL-SCNC: 25 MMOL/L — SIGNIFICANT CHANGE UP (ref 22–31)
CREAT SERPL-MCNC: 0.7 MG/DL — SIGNIFICANT CHANGE UP (ref 0.5–1.3)
GLUCOSE SERPL-MCNC: 64 MG/DL — LOW (ref 70–99)
POTASSIUM SERPL-MCNC: 3.8 MMOL/L — SIGNIFICANT CHANGE UP (ref 3.5–5.3)
POTASSIUM SERPL-SCNC: 3.8 MMOL/L — SIGNIFICANT CHANGE UP (ref 3.5–5.3)
PROT SERPL-MCNC: 6.1 GM/DL — SIGNIFICANT CHANGE UP (ref 6–8.3)
SODIUM SERPL-SCNC: 139 MMOL/L — SIGNIFICANT CHANGE UP (ref 135–145)
T PALLIDUM AB TITR SER: NEGATIVE — SIGNIFICANT CHANGE UP

## 2017-12-04 PROCEDURE — 88305 TISSUE EXAM BY PATHOLOGIST: CPT | Mod: 26

## 2017-12-04 PROCEDURE — 59510 CESAREAN DELIVERY: CPT

## 2017-12-04 RX ORDER — HYDROMORPHONE HYDROCHLORIDE 2 MG/ML
0.5 INJECTION INTRAMUSCULAR; INTRAVENOUS; SUBCUTANEOUS
Qty: 0 | Refills: 0 | Status: DISCONTINUED | OUTPATIENT
Start: 2017-12-04 | End: 2017-12-07

## 2017-12-04 RX ORDER — DOCUSATE SODIUM 100 MG
100 CAPSULE ORAL
Qty: 0 | Refills: 0 | Status: DISCONTINUED | OUTPATIENT
Start: 2017-12-04 | End: 2017-12-07

## 2017-12-04 RX ORDER — ONDANSETRON 8 MG/1
4 TABLET, FILM COATED ORAL EVERY 6 HOURS
Qty: 0 | Refills: 0 | Status: DISCONTINUED | OUTPATIENT
Start: 2017-12-04 | End: 2017-12-07

## 2017-12-04 RX ORDER — DIPHENHYDRAMINE HCL 50 MG
25 CAPSULE ORAL EVERY 6 HOURS
Qty: 0 | Refills: 0 | Status: DISCONTINUED | OUTPATIENT
Start: 2017-12-04 | End: 2017-12-07

## 2017-12-04 RX ORDER — OXYTOCIN 10 UNIT/ML
41.67 VIAL (ML) INJECTION
Qty: 20 | Refills: 0 | Status: DISCONTINUED | OUTPATIENT
Start: 2017-12-04 | End: 2017-12-07

## 2017-12-04 RX ORDER — SODIUM CHLORIDE 9 MG/ML
1000 INJECTION, SOLUTION INTRAVENOUS
Qty: 0 | Refills: 0 | Status: DISCONTINUED | OUTPATIENT
Start: 2017-12-04 | End: 2017-12-07

## 2017-12-04 RX ORDER — LANOLIN
1 OINTMENT (GRAM) TOPICAL
Qty: 0 | Refills: 0 | Status: DISCONTINUED | OUTPATIENT
Start: 2017-12-04 | End: 2017-12-07

## 2017-12-04 RX ORDER — HYDROMORPHONE HYDROCHLORIDE 2 MG/ML
2 INJECTION INTRAMUSCULAR; INTRAVENOUS; SUBCUTANEOUS
Qty: 0 | Refills: 0 | Status: DISCONTINUED | OUTPATIENT
Start: 2017-12-04 | End: 2017-12-07

## 2017-12-04 RX ORDER — SODIUM CHLORIDE 9 MG/ML
1000 INJECTION, SOLUTION INTRAVENOUS ONCE
Qty: 0 | Refills: 0 | Status: DISCONTINUED | OUTPATIENT
Start: 2017-12-04 | End: 2017-12-07

## 2017-12-04 RX ORDER — TETANUS TOXOID, REDUCED DIPHTHERIA TOXOID AND ACELLULAR PERTUSSIS VACCINE, ADSORBED 5; 2.5; 8; 8; 2.5 [IU]/.5ML; [IU]/.5ML; UG/.5ML; UG/.5ML; UG/.5ML
0.5 SUSPENSION INTRAMUSCULAR ONCE
Qty: 0 | Refills: 0 | Status: DISCONTINUED | OUTPATIENT
Start: 2017-12-04 | End: 2017-12-07

## 2017-12-04 RX ORDER — GLYCERIN ADULT
1 SUPPOSITORY, RECTAL RECTAL AT BEDTIME
Qty: 0 | Refills: 0 | Status: DISCONTINUED | OUTPATIENT
Start: 2017-12-04 | End: 2017-12-07

## 2017-12-04 RX ORDER — MORPHINE SULFATE 50 MG/1
0.15 CAPSULE, EXTENDED RELEASE ORAL ONCE
Qty: 0 | Refills: 0 | Status: DISCONTINUED | OUTPATIENT
Start: 2017-12-04 | End: 2017-12-07

## 2017-12-04 RX ORDER — FERROUS SULFATE 325(65) MG
325 TABLET ORAL DAILY
Qty: 0 | Refills: 0 | Status: DISCONTINUED | OUTPATIENT
Start: 2017-12-04 | End: 2017-12-07

## 2017-12-04 RX ORDER — CITRIC ACID/SODIUM CITRATE 300-500 MG
30 SOLUTION, ORAL ORAL ONCE
Qty: 0 | Refills: 0 | Status: COMPLETED | OUTPATIENT
Start: 2017-12-04 | End: 2017-12-04

## 2017-12-04 RX ORDER — ACETAMINOPHEN 500 MG
650 TABLET ORAL EVERY 6 HOURS
Qty: 0 | Refills: 0 | Status: DISCONTINUED | OUTPATIENT
Start: 2017-12-04 | End: 2017-12-07

## 2017-12-04 RX ORDER — HEPARIN SODIUM 5000 [USP'U]/ML
5000 INJECTION INTRAVENOUS; SUBCUTANEOUS EVERY 12 HOURS
Qty: 0 | Refills: 0 | Status: DISCONTINUED | OUTPATIENT
Start: 2017-12-04 | End: 2017-12-07

## 2017-12-04 RX ORDER — OXYTOCIN 10 UNIT/ML
333.33 VIAL (ML) INJECTION
Qty: 20 | Refills: 0 | Status: DISCONTINUED | OUTPATIENT
Start: 2017-12-04 | End: 2017-12-07

## 2017-12-04 RX ORDER — OXYCODONE AND ACETAMINOPHEN 5; 325 MG/1; MG/1
1 TABLET ORAL
Qty: 0 | Refills: 0 | Status: DISCONTINUED | OUTPATIENT
Start: 2017-12-04 | End: 2017-12-07

## 2017-12-04 RX ORDER — METOCLOPRAMIDE HCL 10 MG
10 TABLET ORAL ONCE
Qty: 0 | Refills: 0 | Status: DISCONTINUED | OUTPATIENT
Start: 2017-12-04 | End: 2017-12-07

## 2017-12-04 RX ORDER — SIMETHICONE 80 MG/1
80 TABLET, CHEWABLE ORAL EVERY 4 HOURS
Qty: 0 | Refills: 0 | Status: DISCONTINUED | OUTPATIENT
Start: 2017-12-04 | End: 2017-12-07

## 2017-12-04 RX ORDER — IBUPROFEN 200 MG
600 TABLET ORAL EVERY 6 HOURS
Qty: 0 | Refills: 0 | Status: DISCONTINUED | OUTPATIENT
Start: 2017-12-04 | End: 2017-12-07

## 2017-12-04 RX ORDER — CEFAZOLIN SODIUM 1 G
2000 VIAL (EA) INJECTION ONCE
Qty: 0 | Refills: 0 | Status: DISCONTINUED | OUTPATIENT
Start: 2017-12-04 | End: 2017-12-07

## 2017-12-04 RX ORDER — OXYCODONE AND ACETAMINOPHEN 5; 325 MG/1; MG/1
2 TABLET ORAL EVERY 6 HOURS
Qty: 0 | Refills: 0 | Status: DISCONTINUED | OUTPATIENT
Start: 2017-12-04 | End: 2017-12-07

## 2017-12-04 RX ORDER — NALOXONE HYDROCHLORIDE 4 MG/.1ML
0.1 SPRAY NASAL
Qty: 0 | Refills: 0 | Status: DISCONTINUED | OUTPATIENT
Start: 2017-12-04 | End: 2017-12-07

## 2017-12-04 RX ADMIN — SODIUM CHLORIDE 125 MILLILITER(S): 9 INJECTION, SOLUTION INTRAVENOUS at 20:47

## 2017-12-04 RX ADMIN — HEPARIN SODIUM 5000 UNIT(S): 5000 INJECTION INTRAVENOUS; SUBCUTANEOUS at 22:11

## 2017-12-04 RX ADMIN — HYDROMORPHONE HYDROCHLORIDE 2 MILLIGRAM(S): 2 INJECTION INTRAMUSCULAR; INTRAVENOUS; SUBCUTANEOUS at 19:45

## 2017-12-04 RX ADMIN — Medication 25 MILLIGRAM(S): at 17:08

## 2017-12-04 RX ADMIN — HYDROMORPHONE HYDROCHLORIDE 0.5 MILLIGRAM(S): 2 INJECTION INTRAMUSCULAR; INTRAVENOUS; SUBCUTANEOUS at 13:59

## 2017-12-04 RX ADMIN — HYDROMORPHONE HYDROCHLORIDE 2 MILLIGRAM(S): 2 INJECTION INTRAMUSCULAR; INTRAVENOUS; SUBCUTANEOUS at 19:20

## 2017-12-04 RX ADMIN — HYDROMORPHONE HYDROCHLORIDE 0.5 MILLIGRAM(S): 2 INJECTION INTRAMUSCULAR; INTRAVENOUS; SUBCUTANEOUS at 14:38

## 2017-12-04 RX ADMIN — OXYCODONE AND ACETAMINOPHEN 1 TABLET(S): 5; 325 TABLET ORAL at 23:29

## 2017-12-04 RX ADMIN — Medication 30 MILLILITER(S): at 09:35

## 2017-12-04 RX ADMIN — SODIUM CHLORIDE 125 MILLILITER(S): 9 INJECTION, SOLUTION INTRAVENOUS at 09:34

## 2017-12-04 RX ADMIN — ONDANSETRON 4 MILLIGRAM(S): 8 TABLET, FILM COATED ORAL at 18:48

## 2017-12-04 RX ADMIN — Medication 125 MILLIUNIT(S)/MIN: at 13:00

## 2017-12-05 LAB
BASOPHILS # BLD AUTO: 0.1 K/UL — SIGNIFICANT CHANGE UP (ref 0–0.2)
BASOPHILS NFR BLD AUTO: 0.9 % — SIGNIFICANT CHANGE UP (ref 0–2)
EOSINOPHIL # BLD AUTO: 0 K/UL — SIGNIFICANT CHANGE UP (ref 0–0.5)
EOSINOPHIL NFR BLD AUTO: 0.1 % — SIGNIFICANT CHANGE UP (ref 0–6)
HCT VFR BLD CALC: 34.2 % — LOW (ref 34.5–45)
HGB BLD-MCNC: 11.1 G/DL — LOW (ref 11.5–15.5)
LYMPHOCYTES # BLD AUTO: 1.1 K/UL — SIGNIFICANT CHANGE UP (ref 1–3.3)
LYMPHOCYTES # BLD AUTO: 9.6 % — LOW (ref 13–44)
MCHC RBC-ENTMCNC: 28.4 PG — SIGNIFICANT CHANGE UP (ref 27–34)
MCHC RBC-ENTMCNC: 32.5 GM/DL — SIGNIFICANT CHANGE UP (ref 32–36)
MCV RBC AUTO: 87.3 FL — SIGNIFICANT CHANGE UP (ref 80–100)
MONOCYTES # BLD AUTO: 0.6 K/UL — SIGNIFICANT CHANGE UP (ref 0–0.9)
MONOCYTES NFR BLD AUTO: 4.9 % — SIGNIFICANT CHANGE UP (ref 2–14)
NEUTROPHILS # BLD AUTO: 9.8 K/UL — HIGH (ref 1.8–7.4)
NEUTROPHILS NFR BLD AUTO: 84.5 % — HIGH (ref 43–77)
PLATELET # BLD AUTO: 218 K/UL — SIGNIFICANT CHANGE UP (ref 150–400)
RBC # BLD: 3.91 M/UL — SIGNIFICANT CHANGE UP (ref 3.8–5.2)
RBC # FLD: 11.9 % — SIGNIFICANT CHANGE UP (ref 10.3–14.5)
WBC # BLD: 11.6 K/UL — HIGH (ref 3.8–10.5)
WBC # FLD AUTO: 11.6 K/UL — HIGH (ref 3.8–10.5)

## 2017-12-05 RX ADMIN — Medication 25 MILLIGRAM(S): at 06:36

## 2017-12-05 RX ADMIN — Medication 1 TABLET(S): at 11:27

## 2017-12-05 RX ADMIN — Medication 600 MILLIGRAM(S): at 08:30

## 2017-12-05 RX ADMIN — OXYCODONE AND ACETAMINOPHEN 1 TABLET(S): 5; 325 TABLET ORAL at 07:31

## 2017-12-05 RX ADMIN — OXYCODONE AND ACETAMINOPHEN 1 TABLET(S): 5; 325 TABLET ORAL at 08:30

## 2017-12-05 RX ADMIN — OXYCODONE AND ACETAMINOPHEN 1 TABLET(S): 5; 325 TABLET ORAL at 17:44

## 2017-12-05 RX ADMIN — OXYCODONE AND ACETAMINOPHEN 1 TABLET(S): 5; 325 TABLET ORAL at 14:29

## 2017-12-05 RX ADMIN — OXYCODONE AND ACETAMINOPHEN 1 TABLET(S): 5; 325 TABLET ORAL at 12:30

## 2017-12-05 RX ADMIN — OXYCODONE AND ACETAMINOPHEN 1 TABLET(S): 5; 325 TABLET ORAL at 00:41

## 2017-12-05 RX ADMIN — Medication 600 MILLIGRAM(S): at 14:30

## 2017-12-05 RX ADMIN — OXYCODONE AND ACETAMINOPHEN 1 TABLET(S): 5; 325 TABLET ORAL at 04:34

## 2017-12-05 RX ADMIN — HEPARIN SODIUM 5000 UNIT(S): 5000 INJECTION INTRAVENOUS; SUBCUTANEOUS at 22:26

## 2017-12-05 RX ADMIN — OXYCODONE AND ACETAMINOPHEN 1 TABLET(S): 5; 325 TABLET ORAL at 11:26

## 2017-12-05 RX ADMIN — OXYCODONE AND ACETAMINOPHEN 1 TABLET(S): 5; 325 TABLET ORAL at 20:58

## 2017-12-05 RX ADMIN — SIMETHICONE 80 MILLIGRAM(S): 80 TABLET, CHEWABLE ORAL at 13:59

## 2017-12-05 RX ADMIN — SIMETHICONE 80 MILLIGRAM(S): 80 TABLET, CHEWABLE ORAL at 09:51

## 2017-12-05 RX ADMIN — Medication 600 MILLIGRAM(S): at 18:55

## 2017-12-05 RX ADMIN — Medication 600 MILLIGRAM(S): at 07:30

## 2017-12-05 RX ADMIN — Medication 25 MILLIGRAM(S): at 12:10

## 2017-12-05 RX ADMIN — OXYCODONE AND ACETAMINOPHEN 1 TABLET(S): 5; 325 TABLET ORAL at 05:42

## 2017-12-05 RX ADMIN — Medication 600 MILLIGRAM(S): at 13:59

## 2017-12-05 RX ADMIN — HEPARIN SODIUM 5000 UNIT(S): 5000 INJECTION INTRAVENOUS; SUBCUTANEOUS at 09:51

## 2017-12-05 RX ADMIN — Medication 100 MILLIGRAM(S): at 11:27

## 2017-12-05 RX ADMIN — OXYCODONE AND ACETAMINOPHEN 1 TABLET(S): 5; 325 TABLET ORAL at 14:30

## 2017-12-05 RX ADMIN — Medication 25 MILLIGRAM(S): at 00:02

## 2017-12-05 NOTE — PROGRESS NOTE ADULT - SUBJECTIVE AND OBJECTIVE BOX
Patient evaluated at bedside at approx. 630 am.    She reports pain is well controlled with pain meds.   She denies headache, dizziness, chest pain, palpitations, shortness of breath, nausea, vomiting, heavy vaginal bleeding.  She has been ambulating without assistance, voiding spontaneously, and is breastfeeding. She tolerated clear liquid diet yesterday.  No BM, no flatus    Physical Exam:  Vital Signs Last 24 Hrs  T(C): 37.4 (05 Dec 2017 05:40), Max: 37.4 (05 Dec 2017 05:40)  T(F): 99.3 (05 Dec 2017 05:40), Max: 99.3 (05 Dec 2017 05:40)  HR: 94 (05 Dec 2017 05:40) (63 - 96)  BP: 128/59 (05 Dec 2017 05:40) (125/75 - 153/80)  BP(mean): 108 (04 Dec 2017 16:20) (93 - 112)  RR: 16 (05 Dec 2017 05:40) (16 - 18)  SpO2: 98% (05 Dec 2017 05:40) (98% - 100%)  I&O's Summary    04 Dec 2017 07:01  -  05 Dec 2017 07:00  --------------------------------------------------------  IN: 1125 mL / OUT: 2450 mL / NET: -1325 mL        On exam:  Breasts: soft, nontender, no palpable masses, nipples intact and everted  Abd: + BS, soft, slight tenderness,  slight distention, no rebound or guarding, uterus firm at midline 2 finger breaths above the umbilicus,   : lochia WNL  Extremities: no swelling or calf tenderness,                           12.5   10.4  )-----------( 222      ( 03 Dec 2017 10:16 )             37.6     12-04    139  |  104  |  5<L>  ----------------------------<  64<L>  3.8   |  25  |  0.70    Ca    8.8      04 Dec 2017 15:45    TPro  6.1  /  Alb  2.5<L>  /  TBili  0.3  /  DBili  x   /  AST  21  /  ALT  13  /  AlkPhos  156<H>        12 @ 10:16  RH: --  Type + Screen: O POS      Assessment:  POD 1 s/p primary , myomectomy secondary to hx of abdominal myomectomy.  She is stable    Plan:  encourage ambulation  continue current management.    MADONNA Ware MD

## 2017-12-06 RX ADMIN — OXYCODONE AND ACETAMINOPHEN 1 TABLET(S): 5; 325 TABLET ORAL at 15:40

## 2017-12-06 RX ADMIN — Medication 600 MILLIGRAM(S): at 06:37

## 2017-12-06 RX ADMIN — Medication 325 MILLIGRAM(S): at 12:13

## 2017-12-06 RX ADMIN — HEPARIN SODIUM 5000 UNIT(S): 5000 INJECTION INTRAVENOUS; SUBCUTANEOUS at 22:45

## 2017-12-06 RX ADMIN — OXYCODONE AND ACETAMINOPHEN 1 TABLET(S): 5; 325 TABLET ORAL at 06:37

## 2017-12-06 RX ADMIN — Medication 600 MILLIGRAM(S): at 12:16

## 2017-12-06 RX ADMIN — Medication 600 MILLIGRAM(S): at 20:00

## 2017-12-06 RX ADMIN — Medication 100 MILLIGRAM(S): at 12:20

## 2017-12-06 RX ADMIN — Medication 600 MILLIGRAM(S): at 19:21

## 2017-12-06 RX ADMIN — Medication 1 TABLET(S): at 12:12

## 2017-12-06 RX ADMIN — OXYCODONE AND ACETAMINOPHEN 1 TABLET(S): 5; 325 TABLET ORAL at 15:13

## 2017-12-06 RX ADMIN — OXYCODONE AND ACETAMINOPHEN 1 TABLET(S): 5; 325 TABLET ORAL at 20:00

## 2017-12-06 RX ADMIN — Medication 100 MILLIGRAM(S): at 22:45

## 2017-12-06 RX ADMIN — OXYCODONE AND ACETAMINOPHEN 1 TABLET(S): 5; 325 TABLET ORAL at 19:21

## 2017-12-06 NOTE — PROGRESS NOTE ADULT - SUBJECTIVE AND OBJECTIVE BOX
36yo patient on POD #2 status post primary  section due to prior myomectomy doing well. Tolerating diet and ambulation. Normal lochia. Passing flatus. Postop pain controlled with pain meds.    Vital Signs Last 24 Hrs  T(C): 37 (06 Dec 2017 00:02), Max: 37 (06 Dec 2017 00:02)  T(F): 98.6 (06 Dec 2017 00:02), Max: 98.6 (06 Dec 2017 00:02)  HR: 106 (06 Dec 2017 00:02) (96 - 106)  BP: 134/61 (06 Dec 2017 00:02) (134/61 - 136/71)  BP(mean): --  RR: 16 (06 Dec 2017 00:02) (16 - 16)  SpO2: 98% (06 Dec 2017 00:02) (98% - 98%)                          11.1   11.6  )-----------( 218      ( 05 Dec 2017 06:56 )             34.2       PE:  Gen: AAO x 3  Abd: S&D, c/d/i incision with staples  Pelvic: normal lochia

## 2017-12-06 NOTE — PROGRESS NOTE ADULT - ASSESSMENT
38yo on POD #2 status post  section doing well. H/H stable.    Plan:  1. routine postpartum care  2. continue breastfeeding  3. increase ambulation  4. d/c home tomorrow

## 2017-12-07 ENCOUNTER — TRANSCRIPTION ENCOUNTER (OUTPATIENT)
Age: 37
End: 2017-12-07

## 2017-12-07 VITALS
DIASTOLIC BLOOD PRESSURE: 75 MMHG | RESPIRATION RATE: 16 BRPM | HEART RATE: 84 BPM | SYSTOLIC BLOOD PRESSURE: 136 MMHG | OXYGEN SATURATION: 100 % | TEMPERATURE: 98 F

## 2017-12-07 RX ORDER — LANOLIN
1 OINTMENT (GRAM) TOPICAL
Qty: 0 | Refills: 0 | COMMUNITY
Start: 2017-12-07

## 2017-12-07 RX ORDER — IBUPROFEN 200 MG
1 TABLET ORAL
Qty: 60 | Refills: 3 | OUTPATIENT
Start: 2017-12-07 | End: 2018-02-04

## 2017-12-07 RX ORDER — IBUPROFEN 200 MG
1 TABLET ORAL
Qty: 64 | Refills: 3 | OUTPATIENT
Start: 2017-12-07 | End: 2018-02-08

## 2017-12-07 RX ORDER — ACETAMINOPHEN 500 MG
2 TABLET ORAL
Qty: 0 | Refills: 0 | COMMUNITY
Start: 2017-12-07

## 2017-12-07 RX ADMIN — Medication 325 MILLIGRAM(S): at 13:50

## 2017-12-07 RX ADMIN — HEPARIN SODIUM 5000 UNIT(S): 5000 INJECTION INTRAVENOUS; SUBCUTANEOUS at 10:50

## 2017-12-07 RX ADMIN — Medication 600 MILLIGRAM(S): at 06:30

## 2017-12-07 RX ADMIN — OXYCODONE AND ACETAMINOPHEN 1 TABLET(S): 5; 325 TABLET ORAL at 00:26

## 2017-12-07 RX ADMIN — OXYCODONE AND ACETAMINOPHEN 1 TABLET(S): 5; 325 TABLET ORAL at 05:36

## 2017-12-07 RX ADMIN — Medication 100 MILLIGRAM(S): at 10:50

## 2017-12-07 RX ADMIN — OXYCODONE AND ACETAMINOPHEN 1 TABLET(S): 5; 325 TABLET ORAL at 01:25

## 2017-12-07 RX ADMIN — OXYCODONE AND ACETAMINOPHEN 1 TABLET(S): 5; 325 TABLET ORAL at 10:52

## 2017-12-07 RX ADMIN — Medication 600 MILLIGRAM(S): at 05:35

## 2017-12-07 RX ADMIN — Medication 600 MILLIGRAM(S): at 13:49

## 2017-12-07 RX ADMIN — OXYCODONE AND ACETAMINOPHEN 1 TABLET(S): 5; 325 TABLET ORAL at 06:30

## 2017-12-07 RX ADMIN — OXYCODONE AND ACETAMINOPHEN 1 TABLET(S): 5; 325 TABLET ORAL at 15:53

## 2017-12-07 RX ADMIN — Medication 1 TABLET(S): at 13:49

## 2017-12-07 RX ADMIN — OXYCODONE AND ACETAMINOPHEN 1 TABLET(S): 5; 325 TABLET ORAL at 16:25

## 2017-12-07 NOTE — PROGRESS NOTE ADULT - PROBLEM SELECTOR PLAN 1
1. routine postpartum care  2. continue breastfeeding  3. increase ambulation  4. possible discharge today  5. follow up with obgyn in 6 weeks

## 2017-12-07 NOTE — DISCHARGE NOTE OB - CARE PLAN
Principal Discharge DX:	Delivery by elective  section  Goal:	healthy mother and healthy baby  Instructions for follow-up, activity and diet:	- continue breast feeding as directed  - follow up with obgyn physician in 6 weeks for further evaluation  - take tylenol as directed for pain  - avoid any heavy lifting Principal Discharge DX:	Delivery by elective  section  Goal:	healthy mother and healthy baby  Instructions for follow-up, activity and diet:	- continue breast feeding as directed  - follow up with obgyn physician on  for staple removal and 6 weeks for post partum visit  - take tylenol as directed for pain  - avoid any heavy lifting

## 2017-12-07 NOTE — DISCHARGE NOTE OB - PLAN OF CARE
healthy mother and healthy baby - continue breast feeding as directed  - follow up with obgyn physician in 6 weeks for further evaluation  - take tylenol as directed for pain  - avoid any heavy lifting - continue breast feeding as directed  - follow up with obgyn physician on Monday 12/11 for staple removal and 6 weeks for post partum visit  - take tylenol as directed for pain  - avoid any heavy lifting

## 2017-12-07 NOTE — DISCHARGE NOTE OB - MEDICATION SUMMARY - MEDICATIONS TO TAKE
I will START or STAY ON the medications listed below when I get home from the hospital:    acetaminophen 325 mg oral tablet  -- 2 tab(s) by mouth every 6 hours, As needed, For Temp greater than 38.5 C (101.3 F)  -- Indication: For     lanolin topical ointment  -- 1 application on skin every 3 hours, As needed, Sore Nipples  -- Indication: For breastfeeding    Prenatal Multivitamins with Folic Acid 1 mg oral tablet  -- 1 tab(s) by mouth once a day  -- Indication: For  I will START or STAY ON the medications listed below when I get home from the hospital:     mg oral tablet  -- 1 tab(s) by mouth every 6 hours  prn mild to moderate pain  -- Do not take this drug if you are pregnant.  It is very important that you take or use this exactly as directed.  Do not skip doses or discontinue unless directed by your doctor.  May cause drowsiness or dizziness.  Obtain medical advice before taking any non-prescription drugs as some may affect the action of this medication.  Take with food or milk.    -- Indication: For Delivery by elective  section    oxyCODONE-acetaminophen 5 mg-325 mg oral tablet  -- 1 tab(s) by mouth every 4 hours MDD:4 pills  prn severe pain  -- Caution federal law prohibits the transfer of this drug to any person other  than the person for whom it was prescribed.  May cause drowsiness.  Alcohol may intensify this effect.  Use care when operating dangerous machinery.  This prescription cannot be refilled.  This product contains acetaminophen.  Do not use  with any other product containing acetaminophen to prevent possible liver damage.  Using more of this medication than prescribed may cause serious breathing problems.    -- Indication: For Delivery by elective  section    lanolin topical ointment  -- 1 application on skin every 3 hours, As needed, Sore Nipples  -- Indication: For breastfeeding    Prenatal Multivitamins with Folic Acid 1 mg oral tablet  -- 1 tab(s) by mouth once a day  -- Indication: For PREGNANCY / PRIMARY  SECTION

## 2017-12-07 NOTE — DISCHARGE NOTE OB - CARE PROVIDER_API CALL
Opal Ware), Obstetrics and Gynecology  [address] [place] [address]  Phone: [phone]  Fax: [phone]

## 2017-12-07 NOTE — PROGRESS NOTE ADULT - SUBJECTIVE AND OBJECTIVE BOX
36 yo F G1PO  POD #3 status post primary  section due to prior myomectomy doing well.  Patient was seen and examined at bedside and is resting comfortably. Pt was breastfeeding baby well and baby had no problems latching to nipple. Pt was c/o of minor intermittent pain at the right side of the  staples. However, denies any discharge or bleeding from the incision site. Pt denies any fever, headache, body aches, chills, sob, wheezing, chest congestion, chest pain, palpitations, nausea, vomiting, abdominal pain, diarrhea or constipation.  In addition, pt denies any breast pain or tenderness or erythema, vaginal bleeding, difficulty urinating, lower extremity swelling, difficulty feeding. Pt is passing gas and feels like she will have a bowel movement soon and has no difficulty on ambulation and is ambulating well.     ICU Vital Signs Last 24 Hrs  T(C): 36.5 (07 Dec 2017 07:48), Max: 37.2 (06 Dec 2017 15:59)  T(F): 97.7 (07 Dec 2017 07:48), Max: 98.9 (06 Dec 2017 15:59)  HR: 84 (07 Dec 2017 07:48) (84 - 90)  BP: 136/75 (07 Dec 2017 07:48) (134/65 - 139/74)  RR: 16 (07 Dec 2017 07:48) (16 - 16)  SpO2: 100% (07 Dec 2017 07:48) (98% - 100%)      PE:  Gen: AAO x 3 NAD  CVS: Normal S1 and S2 no murmur, gallops or rales  Lungs: CTA B/L no wheezing, ronchi, rales or crackles  Abd: S&D, c/d/i incision with staples, no discharge, no erythema or swelling - incision site is healing well.  Pelvic: normal lochia  Extremities: no b/l leg swelling    36yo on POD #3 status post  section doing well. H/H stable. 38 yo F G1PO  POD #3 status post primary  section due to prior myomectomy doing well.  Patient was seen and examined at bedside and is resting comfortably. Pt was breastfeeding baby well and baby had no problems latching to nipple. Pt was c/o of minor intermittent pain at the right side of the  staples. However, denies any discharge or bleeding from the incision site. Pt denies any fever, headache, body aches, chills, sob, wheezing, chest congestion, chest pain, palpitations, nausea, vomiting, abdominal pain, diarrhea or constipation.  In addition, pt denies any breast pain or tenderness or erythema, vaginal bleeding, difficulty urinating, lower extremity swelling, difficulty feeding. Pt is passing gas and feels like she will have a bowel movement soon and has no difficulty on ambulation and is ambulating well.     ICU Vital Signs Last 24 Hrs  T(C): 36.5 (07 Dec 2017 07:48), Max: 37.2 (06 Dec 2017 15:59)  T(F): 97.7 (07 Dec 2017 07:48), Max: 98.9 (06 Dec 2017 15:59)  HR: 84 (07 Dec 2017 07:48) (84 - 90)  BP: 136/75 (07 Dec 2017 07:48) (134/65 - 139/74)  RR: 16 (07 Dec 2017 07:48) (16 - 16)  SpO2: 100% (07 Dec 2017 07:48) (98% - 100%)      PE:  Gen: AAO x 3 NAD  CVS: Normal S1 and S2 no murmur, gallops or rales  Lungs: CTA B/L no wheezing, ronchi, rales or crackles  Abd: S&D, c/d/i incision with staples, no discharge, no erythema or swelling - incision site is healing well.  Uterus- fundus firm  Pelvic: normal lochia  Extremities: no b/l leg swelling    38yo on POD #3 status post  section doing well. H/H stable.    ATTENDING NOTE:  Patient was seen and examined at bedside. She has no complaints. She is tolerating diet. ambulating without difficulty, passing flatus. No BM  VSS afebrile  exam as above  A/p: POD 3 s/p primary Csection secondary to hx of myomectomy doing well  discharge home  follow up on Monday in the office for staple removal  R. Antilus, MD

## 2017-12-07 NOTE — DISCHARGE NOTE OB - PATIENT PORTAL LINK FT
“You can access the [facility] Patient Portal, offered by [facility], by registering with the following website: [email or web address]”

## 2017-12-07 NOTE — DISCHARGE NOTE OB - MATERIALS PROVIDED
Breastfeeding Log/Breastfeeding Mother’s Support Group Information/Guide to Postpartum Care/Shaken Baby Prevention Handout/[place] Hearing Screen Program/Breastfeeding Guide and Packet/Back To Sleep Handout/  Immunization Record

## 2017-12-07 NOTE — DISCHARGE NOTE OB - HOSPITAL COURSE
38 yo F G1PO @39 weeks admitted to L&D at  for primary  section due to prior myomectomy. Pt had successful  with staples and no complications. Pt was kept for 3 days at  and is stable for discharge. Pt will follow up with OBGYN in 6 weeks and will remove staples with OBGYN.

## 2017-12-08 ENCOUNTER — MESSAGE (OUTPATIENT)
Age: 37
End: 2017-12-08

## 2017-12-08 LAB — SURGICAL PATHOLOGY FINAL REPORT - CH: SIGNIFICANT CHANGE UP

## 2017-12-11 ENCOUNTER — APPOINTMENT (OUTPATIENT)
Dept: OBGYN | Facility: CLINIC | Age: 37
End: 2017-12-11
Payer: OTHER GOVERNMENT

## 2017-12-11 VITALS
WEIGHT: 204 LBS | DIASTOLIC BLOOD PRESSURE: 65 MMHG | SYSTOLIC BLOOD PRESSURE: 118 MMHG | TEMPERATURE: 98.6 F | BODY MASS INDEX: 32.78 KG/M2 | HEIGHT: 66 IN

## 2017-12-11 PROCEDURE — 0503F POSTPARTUM CARE VISIT: CPT

## 2017-12-12 DIAGNOSIS — Z79.899 OTHER LONG TERM (CURRENT) DRUG THERAPY: ICD-10-CM

## 2017-12-12 DIAGNOSIS — O34.13 MATERNAL CARE FOR BENIGN TUMOR OF CORPUS UTERI, THIRD TRIMESTER: ICD-10-CM

## 2017-12-12 DIAGNOSIS — D25.9 LEIOMYOMA OF UTERUS, UNSPECIFIED: ICD-10-CM

## 2017-12-12 DIAGNOSIS — Z3A.39 39 WEEKS GESTATION OF PREGNANCY: ICD-10-CM

## 2017-12-12 DIAGNOSIS — O34.29 MATERNAL CARE DUE TO UTERINE SCAR FROM OTHER PREVIOUS SURGERY: ICD-10-CM

## 2017-12-12 DIAGNOSIS — A60.00 HERPESVIRAL INFECTION OF UROGENITAL SYSTEM, UNSPECIFIED: ICD-10-CM

## 2017-12-12 DIAGNOSIS — O09.513 SUPERVISION OF ELDERLY PRIMIGRAVIDA, THIRD TRIMESTER: ICD-10-CM

## 2017-12-20 ENCOUNTER — APPOINTMENT (OUTPATIENT)
Dept: OBGYN | Facility: CLINIC | Age: 37
End: 2017-12-20
Payer: OTHER GOVERNMENT

## 2017-12-20 VITALS
TEMPERATURE: 98.3 F | HEIGHT: 66 IN | DIASTOLIC BLOOD PRESSURE: 72 MMHG | SYSTOLIC BLOOD PRESSURE: 118 MMHG | WEIGHT: 194 LBS | BODY MASS INDEX: 31.18 KG/M2

## 2017-12-20 PROCEDURE — 0503F POSTPARTUM CARE VISIT: CPT

## 2018-01-12 ENCOUNTER — CLINICAL ADVICE (OUTPATIENT)
Age: 38
End: 2018-01-12

## 2018-04-13 ENCOUNTER — TRANSCRIPTION ENCOUNTER (OUTPATIENT)
Age: 38
End: 2018-04-13

## 2018-07-28 PROBLEM — Z78.9 ALCOHOL USE: Status: ACTIVE | Noted: 2017-04-17

## 2018-10-01 ENCOUNTER — APPOINTMENT (OUTPATIENT)
Dept: OBGYN | Facility: CLINIC | Age: 38
End: 2018-10-01
Payer: OTHER GOVERNMENT

## 2018-10-01 VITALS
DIASTOLIC BLOOD PRESSURE: 89 MMHG | WEIGHT: 194 LBS | SYSTOLIC BLOOD PRESSURE: 165 MMHG | HEART RATE: 71 BPM | HEIGHT: 66 IN | BODY MASS INDEX: 31.18 KG/M2

## 2018-10-01 DIAGNOSIS — Z30.09 ENCOUNTER FOR OTHER GENERAL COUNSELING AND ADVICE ON CONTRACEPTION: ICD-10-CM

## 2018-10-01 DIAGNOSIS — R03.0 ELEVATED BLOOD-PRESSURE READING, W/OUT DIAGNOSIS OF HYPERTENSION: ICD-10-CM

## 2018-10-01 PROCEDURE — 99213 OFFICE O/P EST LOW 20 MIN: CPT

## 2018-10-09 ENCOUNTER — RX RENEWAL (OUTPATIENT)
Age: 38
End: 2018-10-09

## 2018-10-22 ENCOUNTER — APPOINTMENT (OUTPATIENT)
Dept: FAMILY MEDICINE | Facility: CLINIC | Age: 38
End: 2018-10-22
Payer: OTHER GOVERNMENT

## 2018-10-22 VITALS
RESPIRATION RATE: 14 BRPM | DIASTOLIC BLOOD PRESSURE: 91 MMHG | WEIGHT: 195 LBS | SYSTOLIC BLOOD PRESSURE: 152 MMHG | BODY MASS INDEX: 31.34 KG/M2 | HEART RATE: 87 BPM | OXYGEN SATURATION: 87 % | HEIGHT: 66 IN

## 2018-10-22 DIAGNOSIS — I10 ESSENTIAL (PRIMARY) HYPERTENSION: ICD-10-CM

## 2018-10-22 DIAGNOSIS — G56.00 CARPAL TUNNEL SYNDROME, UNSPECIFIED UPPER LIMB: ICD-10-CM

## 2018-10-22 DIAGNOSIS — Z87.440 PERSONAL HISTORY OF URINARY (TRACT) INFECTIONS: ICD-10-CM

## 2018-10-22 DIAGNOSIS — O09.513 SUPERVISION OF ELDERLY PRIMIGRAVIDA, THIRD TRIMESTER: ICD-10-CM

## 2018-10-22 DIAGNOSIS — Z51.89 ENCOUNTER FOR OTHER SPECIFIED AFTERCARE: ICD-10-CM

## 2018-10-22 DIAGNOSIS — Z3A.37 37 WEEKS GESTATION OF PREGNANCY: ICD-10-CM

## 2018-10-22 DIAGNOSIS — Z01.419 ENCOUNTER FOR GYNECOLOGICAL EXAMINATION (GENERAL) (ROUTINE) W/OUT ABNORMAL FINDINGS: ICD-10-CM

## 2018-10-22 DIAGNOSIS — Z23 ENCOUNTER FOR IMMUNIZATION: ICD-10-CM

## 2018-10-22 DIAGNOSIS — O35.9XX9 MATERNAL CARE FOR (SUSPECTED) FETAL ABNORMALITY AND DAMAGE, UNSPECIFIED, OTHER FETUS: ICD-10-CM

## 2018-10-22 DIAGNOSIS — Z92.89 PERSONAL HISTORY OF OTHER MEDICAL TREATMENT: ICD-10-CM

## 2018-10-22 DIAGNOSIS — Z20.828 CONTACT WITH AND (SUSPECTED) EXPOSURE TO OTHER VIRAL COMMUNICABLE DISEASES: ICD-10-CM

## 2018-10-22 DIAGNOSIS — Z87.42 PERSONAL HISTORY OF OTHER DISEASES OF THE FEMALE GENITAL TRACT: ICD-10-CM

## 2018-10-22 DIAGNOSIS — Z3A.35 35 WEEKS GESTATION OF PREGNANCY: ICD-10-CM

## 2018-10-22 DIAGNOSIS — Z3A.38 38 WEEKS GESTATION OF PREGNANCY: ICD-10-CM

## 2018-10-22 DIAGNOSIS — Z98.890 OTHER SPECIFIED POSTPROCEDURAL STATES: ICD-10-CM

## 2018-10-22 DIAGNOSIS — G89.18 OTHER ACUTE POSTPROCEDURAL PAIN: ICD-10-CM

## 2018-10-22 PROCEDURE — 99203 OFFICE O/P NEW LOW 30 MIN: CPT | Mod: 25

## 2018-10-22 PROCEDURE — 93000 ELECTROCARDIOGRAM COMPLETE: CPT

## 2018-10-22 RX ORDER — PRENATAL VIT NO.130/IRON/FOLIC 27MG-0.8MG
28-0.8 TABLET ORAL
Refills: 0 | Status: COMPLETED | COMMUNITY
End: 2018-10-22

## 2018-10-22 RX ORDER — VALACYCLOVIR 500 MG/1
500 TABLET, FILM COATED ORAL DAILY
Qty: 30 | Refills: 6 | Status: COMPLETED | COMMUNITY
Start: 2017-11-08 | End: 2018-10-22

## 2018-10-22 RX ORDER — CEPHALEXIN 500 MG/1
500 CAPSULE ORAL
Qty: 14 | Refills: 0 | Status: COMPLETED | COMMUNITY
Start: 2018-01-12 | End: 2018-10-22

## 2018-10-22 RX ORDER — INFLUENZA A VIRUS A/VICTORIA/4897/2022 IVR-238 (H1N1) ANTIGEN (FORMALDEHYDE INACTIVATED), INFLUENZA A VIRUS A/DARWIN/9/2021 SAN-010 (H3N2) ANTIGEN (FORMALDEHYDE INACTIVATED), INFLUENZA B VIRUS B/PHUKET/3073/2013 ANTIGEN (FORMALDEHYDE INACTIVATED), AND INFLUENZA B VIRUS B/MICHIGAN/01/2021 ANTIGEN (FORMALDEHYDE INACTIVATED) 15; 15; 15; 15 UG/.5ML; UG/.5ML; UG/.5ML; UG/.5ML
0.5 INJECTION, SUSPENSION INTRAMUSCULAR
Qty: 0.5 | Refills: 0 | Status: COMPLETED | COMMUNITY
Start: 2017-09-27 | End: 2018-10-22

## 2018-10-22 RX ORDER — MISOPROSTOL 100 UG/1
100 TABLET ORAL
Qty: 2 | Refills: 0 | Status: COMPLETED | COMMUNITY
Start: 2018-10-01 | End: 2018-10-22

## 2018-10-22 RX ORDER — VALACYCLOVIR 500 MG/1
500 TABLET, FILM COATED ORAL DAILY
Qty: 30 | Refills: 6 | Status: COMPLETED | COMMUNITY
Start: 2017-10-19 | End: 2018-10-22

## 2018-10-22 RX ORDER — FERROUS SULFATE 142(45)MG
45 TABLET, EXTENDED RELEASE ORAL
Qty: 30 | Refills: 0 | Status: COMPLETED | COMMUNITY
Start: 2017-12-08 | End: 2018-10-22

## 2018-10-22 RX ORDER — OXYCODONE AND ACETAMINOPHEN 5; 325 MG/1; MG/1
5-325 TABLET ORAL
Qty: 16 | Refills: 0 | Status: COMPLETED | COMMUNITY
Start: 2017-12-08 | End: 2018-10-22

## 2018-10-22 RX ORDER — FLUCONAZOLE 150 MG/1
150 TABLET ORAL
Qty: 1 | Refills: 1 | Status: COMPLETED | COMMUNITY
Start: 2018-02-14 | End: 2018-10-22

## 2018-10-22 RX ORDER — ASCORBIC ACID, CHOLECALCIFEROL, .ALPHA.-TOCOPHEROL, DL-, FOLIC ACID, PYRIDOXINE HYDROCHLORIDE, CYANOCOBALAMIN, CALCIUM FORMATE, FERROUS ASPARTO GLYCINATE, MAGNESIUM OXIDE AND DOCONEXENT 90; 400; 40; 1; 26; 25; 155; 18; 50; 300 MG/1; [IU]/1; [IU]/1; MG/1; MG/1; UG/1; MG/1; MG/1; MG/1; MG/1
18-0.6-0.4-3 CAPSULE, GELATIN COATED ORAL
Qty: 28 | Refills: 8 | Status: COMPLETED | COMMUNITY
Start: 2017-07-24 | End: 2018-10-22

## 2018-10-22 NOTE — PAST MEDICAL HISTORY
[Menstruating] : hx of menstruating [Definite ___ (Date)] : the last menstrual period was [unfilled] [Regular Cycle Intervals] : have been regular [Live Births___] : P: [unfilled] [Full Term ___] : Full Term: [unfilled]

## 2018-11-07 ENCOUNTER — APPOINTMENT (OUTPATIENT)
Dept: OBGYN | Facility: CLINIC | Age: 38
End: 2018-11-07
Payer: OTHER GOVERNMENT

## 2018-11-07 VITALS
BODY MASS INDEX: 32.32 KG/M2 | SYSTOLIC BLOOD PRESSURE: 137 MMHG | HEIGHT: 66 IN | WEIGHT: 201.13 LBS | HEART RATE: 84 BPM | DIASTOLIC BLOOD PRESSURE: 97 MMHG

## 2018-11-07 DIAGNOSIS — N76.0 ACUTE VAGINITIS: ICD-10-CM

## 2018-11-07 DIAGNOSIS — Z01.419 ENCOUNTER FOR GYNECOLOGICAL EXAMINATION (GENERAL) (ROUTINE) W/OUT ABNORMAL FINDINGS: ICD-10-CM

## 2018-11-07 DIAGNOSIS — B96.89 ACUTE VAGINITIS: ICD-10-CM

## 2018-11-07 PROCEDURE — 99395 PREV VISIT EST AGE 18-39: CPT

## 2018-11-07 RX ORDER — FLUCONAZOLE 150 MG/1
150 TABLET ORAL DAILY
Qty: 1 | Refills: 1 | Status: ACTIVE | COMMUNITY
Start: 2018-11-07 | End: 1900-01-01

## 2018-11-07 RX ORDER — TINIDAZOLE 500 MG/1
500 TABLET, FILM COATED ORAL DAILY
Qty: 8 | Refills: 1 | Status: ACTIVE | COMMUNITY
Start: 2018-11-07 | End: 1900-01-01

## 2018-11-09 LAB
C TRACH RRNA SPEC QL NAA+PROBE: NOT DETECTED
CANDIDA VAG CYTO: NOT DETECTED
G VAGINALIS+PREV SP MTYP VAG QL MICRO: DETECTED
N GONORRHOEA RRNA SPEC QL NAA+PROBE: NOT DETECTED
SOURCE TP AMPLIFICATION: NORMAL
T VAGINALIS VAG QL WET PREP: NOT DETECTED

## 2018-11-09 NOTE — HISTORY OF PRESENT ILLNESS
[FreeTextEntry8] : Patient presents to establish care. She reports that she has been told her blood pressure is high at her Ob/Gyn visits and needs a PCP.\par She notes that she has a 10-month old baby and had hypertension during pregnancy. She has had several elevated readings. \par She also developed carpal tunnel during pregnancy\par She will be obtaining an IUD\par Reports a hx of fibroids, difficulty losing weight and occasional headaches that resolve with rest or OTC medication. \par

## 2018-11-09 NOTE — REVIEW OF SYSTEMS
[Headache] : headache [Negative] : Psychiatric [FreeTextEntry2] : difficulty losing weight  [FreeTextEntry9] : hand and wrist pain

## 2018-11-09 NOTE — PLAN
[FreeTextEntry1] : 1. Hypertension- EKG within normal limits. Will start amlodipine in view of hx. Side effects reviewed. Recheck blood pressure in 2-3 weeks. Obtain labs.\par 2. Carpal tunnel- hand surgery eval\par 3. HCM- labs, dental, diet and exercise to improve weight.

## 2018-11-09 NOTE — PHYSICAL EXAM
[No Acute Distress] : no acute distress [Well Nourished] : well nourished [Well Developed] : well developed [Well-Appearing] : well-appearing [Normal Sclera/Conjunctiva] : normal sclera/conjunctiva [PERRL] : pupils equal round and reactive to light [Normal Outer Ear/Nose] : the outer ears and nose were normal in appearance [Normal Oropharynx] : the oropharynx was normal [Normal TMs] : both tympanic membranes were normal [No JVD] : no jugular venous distention [Supple] : supple [No Lymphadenopathy] : no lymphadenopathy [No Respiratory Distress] : no respiratory distress  [Clear to Auscultation] : lungs were clear to auscultation bilaterally [No Accessory Muscle Use] : no accessory muscle use [Normal Rate] : normal rate  [Regular Rhythm] : with a regular rhythm [Normal S1, S2] : normal S1 and S2 [Pedal Pulses Present] : the pedal pulses are present [No Edema] : there was no peripheral edema [No Extremity Clubbing/Cyanosis] : no extremity clubbing/cyanosis [Soft] : abdomen soft [Non Tender] : non-tender [Non-distended] : non-distended [Normal Bowel Sounds] : normal bowel sounds [Normal Supraclavicular Nodes] : no supraclavicular lymphadenopathy [Normal Posterior Cervical Nodes] : no posterior cervical lymphadenopathy [Normal Anterior Cervical Nodes] : no anterior cervical lymphadenopathy [No CVA Tenderness] : no CVA  tenderness [No Spinal Tenderness] : no spinal tenderness [No Joint Swelling] : no joint swelling [Grossly Normal Strength/Tone] : grossly normal strength/tone [No Rash] : no rash [Normal Gait] : normal gait [Coordination Grossly Intact] : coordination grossly intact [No Focal Deficits] : no focal deficits [Normal Affect] : the affect was normal [Normal Insight/Judgement] : insight and judgment were intact

## 2018-11-09 NOTE — HEALTH RISK ASSESSMENT
[0] : 2) Feeling down, depressed, or hopeless: Not at all (0) [] : No [de-identified] : social [[id number]] : 0

## 2018-11-12 LAB
CYTOLOGY CVX/VAG DOC THIN PREP: NORMAL
HPV HIGH+LOW RISK DNA PNL CVX: NOT DETECTED

## 2018-11-19 ENCOUNTER — APPOINTMENT (OUTPATIENT)
Dept: ORTHOPEDIC SURGERY | Facility: CLINIC | Age: 38
End: 2018-11-19
Payer: OTHER GOVERNMENT

## 2018-11-19 VITALS
SYSTOLIC BLOOD PRESSURE: 129 MMHG | WEIGHT: 201 LBS | HEART RATE: 76 BPM | TEMPERATURE: 99.9 F | BODY MASS INDEX: 32.3 KG/M2 | HEIGHT: 66 IN | DIASTOLIC BLOOD PRESSURE: 79 MMHG

## 2018-11-19 PROCEDURE — 99204 OFFICE O/P NEW MOD 45 MIN: CPT

## 2018-11-19 PROCEDURE — 73110 X-RAY EXAM OF WRIST: CPT | Mod: LT

## 2018-11-19 RX ORDER — MELOXICAM 7.5 MG/1
7.5 TABLET ORAL TWICE DAILY
Qty: 60 | Refills: 2 | Status: ACTIVE | COMMUNITY
Start: 2018-11-19 | End: 1900-01-01

## 2018-12-11 ENCOUNTER — APPOINTMENT (OUTPATIENT)
Dept: ORTHOPEDIC SURGERY | Facility: CLINIC | Age: 38
End: 2018-12-11
Payer: OTHER GOVERNMENT

## 2018-12-11 VITALS
HEIGHT: 66 IN | SYSTOLIC BLOOD PRESSURE: 144 MMHG | HEART RATE: 93 BPM | DIASTOLIC BLOOD PRESSURE: 90 MMHG | WEIGHT: 201 LBS | BODY MASS INDEX: 32.3 KG/M2

## 2018-12-11 DIAGNOSIS — M65.4 RADIAL STYLOID TENOSYNOVITIS [DE QUERVAIN]: ICD-10-CM

## 2018-12-11 PROCEDURE — 99213 OFFICE O/P EST LOW 20 MIN: CPT | Mod: 25

## 2018-12-11 PROCEDURE — 20550 NJX 1 TENDON SHEATH/LIGAMENT: CPT | Mod: LT

## 2018-12-31 ENCOUNTER — APPOINTMENT (OUTPATIENT)
Dept: ORTHOPEDIC SURGERY | Facility: CLINIC | Age: 38
End: 2018-12-31

## 2019-02-15 ENCOUNTER — TRANSCRIPTION ENCOUNTER (OUTPATIENT)
Age: 39
End: 2019-02-15

## 2019-03-29 ENCOUNTER — RX RENEWAL (OUTPATIENT)
Age: 39
End: 2019-03-29

## 2019-03-29 RX ORDER — AMLODIPINE BESYLATE 5 MG/1
5 TABLET ORAL DAILY
Qty: 30 | Refills: 0 | Status: ACTIVE | COMMUNITY
Start: 2018-10-22 | End: 1900-01-01

## 2019-06-05 ENCOUNTER — OTHER (OUTPATIENT)
Age: 39
End: 2019-06-05

## 2019-06-05 DIAGNOSIS — Z86.19 PERSONAL HISTORY OF OTHER INFECTIOUS AND PARASITIC DISEASES: ICD-10-CM

## 2019-06-05 RX ORDER — FLUCONAZOLE 150 MG/1
150 TABLET ORAL DAILY
Qty: 1 | Refills: 1 | Status: ACTIVE | COMMUNITY
Start: 2019-06-05 | End: 1900-01-01

## 2019-06-16 ENCOUNTER — TRANSCRIPTION ENCOUNTER (OUTPATIENT)
Age: 39
End: 2019-06-16

## 2020-07-10 NOTE — DISCHARGE NOTE OB - MEDICATION SUMMARY - MEDICATIONS TO STOP TAKING
Weight bearing as tolerated I will STOP taking the medications listed below when I get home from the hospital:  None

## 2020-12-16 PROBLEM — Z01.419 ENCOUNTER FOR GYNECOLOGICAL EXAMINATION WITH PAPANICOLAOU SMEAR OF CERVIX: Status: RESOLVED | Noted: 2018-11-07 | Resolved: 2020-12-16

## 2020-12-16 PROBLEM — N76.0 BACTERIAL VAGINOSIS: Status: RESOLVED | Noted: 2018-11-07 | Resolved: 2020-12-16

## 2020-12-21 PROBLEM — Z86.19 HISTORY OF CANDIDIASIS OF VAGINA: Status: RESOLVED | Noted: 2018-02-14 | Resolved: 2020-12-21

## 2021-07-27 NOTE — PATIENT PROFILE OB - BABY A: MECONIUM PRESENT, DELIVERY
5-Fu Pregnancy And Lactation Text: This medication is Pregnancy Category X and contraindicated in pregnancy and in women who may become pregnant. It is unknown if this medication is excreted in breast milk. yes/terminal mec

## 2021-10-06 PROBLEM — I10 ESSENTIAL HYPERTENSION: Status: ACTIVE | Noted: 2018-10-22
